# Patient Record
Sex: MALE | Race: WHITE | NOT HISPANIC OR LATINO | Employment: UNEMPLOYED | URBAN - METROPOLITAN AREA
[De-identification: names, ages, dates, MRNs, and addresses within clinical notes are randomized per-mention and may not be internally consistent; named-entity substitution may affect disease eponyms.]

---

## 2018-12-20 ENCOUNTER — APPOINTMENT (OUTPATIENT)
Dept: RADIOLOGY | Facility: CLINIC | Age: 23
End: 2018-12-20
Payer: COMMERCIAL

## 2018-12-20 DIAGNOSIS — M79.674 PAIN IN TOE OF RIGHT FOOT: ICD-10-CM

## 2018-12-20 PROCEDURE — 73660 X-RAY EXAM OF TOE(S): CPT

## 2019-01-24 ENCOUNTER — ANESTHESIA EVENT (OUTPATIENT)
Dept: PERIOP | Facility: HOSPITAL | Age: 24
End: 2019-01-24
Payer: COMMERCIAL

## 2019-01-24 NOTE — PRE-PROCEDURE INSTRUCTIONS
Pre-op Showering Instructions for Surgery Patients    Before your operation, you play an important role in decreasing your risk for infection by washing with special antiseptic soap  This is an effective way to reduce bacteria on the skin which may help to prevent infections at the surgical site  Please read the following directions in advance  1  In the week before your operation, purchase a 4 ounce bottle of antiseptic soap containing chlorhexidine gluconate (CHG)  4%  Some brand names include: Aplicare®, Endure, and Hibiclens®  The cost is usually less than $5 00   For your convenience, the [employer] carries the soap   It may also be available at your doctors office or pre-admission testing center, and at most retail pharmacies   If you are allergic or sensitive to soaps containing CHG, please let your doctor know so another antiseptic can be suggested   CHG antiseptic soap is for external use only  2  The day before your operation, follow these instructions carefully to get ready   Please clean linens (sheets) on your bed; you should sleep on clean sheets after your evening shower   Get clean towels and washcloth ready - you need enough for 2 showers   Set aside clean underwear, pajamas, and clothing to wear after the showers     Reminders:   DO NOT use any other soap or body rinse on your skin during or after the antiseptic showers   DO NOT use lotion, powder, deodorant, or perfume/aftershave of any kind on your skin after your antiseptic shower   DO NOT shave any body parts in the 24 hours/day before your operation   DO NOT get the antiseptic soap in your eyes, ears, nose, mouth, or vaginal area    3  You will need to shower the night before AND the morning of your surgery  Shower 1:   The first evening before the operation, take the first shower   First, shampoo your hair with regular shampoo and rinse it completely before you use the antiseptic soap   After washing and rinsing your hair, rinse your body   Next, use a clean washcloth to apply the antiseptic soap and wash your body from the neck down to your toes using ½ bottle of the antiseptic soap  You will use the other ½ bottle for the second shower   Clean the area where your incision will be; lather this area well for about 2 minutes   If you are having head or neck surgery, wash areas with the antiseptic soap   Rinse yourself completely with running water   Use a clean towel to dry off   Wear clean underwear and clothing/pajamas  Shower 2   The morning of your operation, take the second shower following the same steps as Shower 1 using the second ½ of the bottle of antiseptic soap   Use clean cloths and towels to wash and dry yourself   Wear clean underwear and clothing    No outpatient prescriptions have been marked as taking for the 1/25/19 encounter [facility] Encounter)

## 2019-01-25 ENCOUNTER — HOSPITAL ENCOUNTER (OUTPATIENT)
Facility: HOSPITAL | Age: 24
Setting detail: OUTPATIENT SURGERY
Discharge: HOME | End: 2019-01-25
Attending: PODIATRIST | Admitting: PODIATRIST
Payer: COMMERCIAL

## 2019-01-25 ENCOUNTER — ANESTHESIA (OUTPATIENT)
Dept: PERIOP | Facility: HOSPITAL | Age: 24
End: 2019-01-25
Payer: COMMERCIAL

## 2019-01-25 ENCOUNTER — APPOINTMENT (OUTPATIENT)
Dept: RADIOLOGY | Facility: HOSPITAL | Age: 24
End: 2019-01-25
Payer: COMMERCIAL

## 2019-01-25 VITALS
BODY MASS INDEX: 22.73 KG/M2 | HEART RATE: 64 BPM | SYSTOLIC BLOOD PRESSURE: 103 MMHG | HEIGHT: 68 IN | WEIGHT: 150 LBS | DIASTOLIC BLOOD PRESSURE: 46 MMHG | OXYGEN SATURATION: 99 % | RESPIRATION RATE: 14 BRPM | TEMPERATURE: 97.6 F

## 2019-01-25 DIAGNOSIS — M79.5 FOREIGN BODY (FB) IN SOFT TISSUE: Primary | ICD-10-CM

## 2019-01-25 PROCEDURE — 73620 X-RAY EXAM OF FOOT: CPT

## 2019-01-25 RX ORDER — CEFAZOLIN SODIUM 1 G/50ML
1000 SOLUTION INTRAVENOUS ONCE
Status: COMPLETED | OUTPATIENT
Start: 2019-01-25 | End: 2019-01-25

## 2019-01-25 RX ORDER — OXYCODONE HYDROCHLORIDE AND ACETAMINOPHEN 5; 325 MG/1; MG/1
1 TABLET ORAL EVERY 4 HOURS PRN
Qty: 10 TABLET | Refills: 0 | Status: SHIPPED | OUTPATIENT
Start: 2019-01-25 | End: 2019-02-04

## 2019-01-25 RX ORDER — MAGNESIUM HYDROXIDE 1200 MG/15ML
LIQUID ORAL AS NEEDED
Status: DISCONTINUED | OUTPATIENT
Start: 2019-01-25 | End: 2019-01-25 | Stop reason: HOSPADM

## 2019-01-25 RX ORDER — LIDOCAINE HYDROCHLORIDE 10 MG/ML
INJECTION, SOLUTION INFILTRATION; PERINEURAL AS NEEDED
Status: DISCONTINUED | OUTPATIENT
Start: 2019-01-25 | End: 2019-01-25 | Stop reason: SURG

## 2019-01-25 RX ORDER — PROPOFOL 10 MG/ML
INJECTION, EMULSION INTRAVENOUS CONTINUOUS PRN
Status: DISCONTINUED | OUTPATIENT
Start: 2019-01-25 | End: 2019-01-25 | Stop reason: SURG

## 2019-01-25 RX ORDER — GLYCOPYRROLATE 0.2 MG/ML
INJECTION INTRAMUSCULAR; INTRAVENOUS AS NEEDED
Status: DISCONTINUED | OUTPATIENT
Start: 2019-01-25 | End: 2019-01-25 | Stop reason: SURG

## 2019-01-25 RX ORDER — ONDANSETRON 2 MG/ML
INJECTION INTRAMUSCULAR; INTRAVENOUS AS NEEDED
Status: DISCONTINUED | OUTPATIENT
Start: 2019-01-25 | End: 2019-01-25 | Stop reason: SURG

## 2019-01-25 RX ORDER — MIDAZOLAM HYDROCHLORIDE 1 MG/ML
INJECTION INTRAMUSCULAR; INTRAVENOUS AS NEEDED
Status: DISCONTINUED | OUTPATIENT
Start: 2019-01-25 | End: 2019-01-25 | Stop reason: SURG

## 2019-01-25 RX ORDER — PROPOFOL 10 MG/ML
INJECTION, EMULSION INTRAVENOUS AS NEEDED
Status: DISCONTINUED | OUTPATIENT
Start: 2019-01-25 | End: 2019-01-25 | Stop reason: SURG

## 2019-01-25 RX ORDER — SODIUM CHLORIDE 9 MG/ML
125 INJECTION, SOLUTION INTRAVENOUS CONTINUOUS
Status: DISCONTINUED | OUTPATIENT
Start: 2019-01-25 | End: 2019-01-25 | Stop reason: HOSPADM

## 2019-01-25 RX ORDER — FENTANYL CITRATE 50 UG/ML
INJECTION, SOLUTION INTRAMUSCULAR; INTRAVENOUS AS NEEDED
Status: DISCONTINUED | OUTPATIENT
Start: 2019-01-25 | End: 2019-01-25 | Stop reason: SURG

## 2019-01-25 RX ORDER — SODIUM CHLORIDE, SODIUM LACTATE, POTASSIUM CHLORIDE, CALCIUM CHLORIDE 600; 310; 30; 20 MG/100ML; MG/100ML; MG/100ML; MG/100ML
50 INJECTION, SOLUTION INTRAVENOUS CONTINUOUS
Status: DISCONTINUED | OUTPATIENT
Start: 2019-01-26 | End: 2019-01-25 | Stop reason: HOSPADM

## 2019-01-25 RX ORDER — OXYCODONE HYDROCHLORIDE AND ACETAMINOPHEN 5; 325 MG/1; MG/1
1 TABLET ORAL EVERY 4 HOURS PRN
Status: DISCONTINUED | OUTPATIENT
Start: 2019-01-25 | End: 2019-01-25 | Stop reason: HOSPADM

## 2019-01-25 RX ADMIN — ONDANSETRON HYDROCHLORIDE 4 MG: 2 INJECTION, SOLUTION INTRAMUSCULAR; INTRAVENOUS at 10:24

## 2019-01-25 RX ADMIN — FENTANYL CITRATE 100 MCG: 50 INJECTION INTRAMUSCULAR; INTRAVENOUS at 09:53

## 2019-01-25 RX ADMIN — PROPOFOL 50 MG: 10 INJECTION, EMULSION INTRAVENOUS at 09:57

## 2019-01-25 RX ADMIN — PROPOFOL 50 MG: 10 INJECTION, EMULSION INTRAVENOUS at 10:00

## 2019-01-25 RX ADMIN — CEFAZOLIN SODIUM 1000 MG: 1 SOLUTION INTRAVENOUS at 10:05

## 2019-01-25 RX ADMIN — LIDOCAINE HYDROCHLORIDE 50 MG: 10 INJECTION, SOLUTION INFILTRATION; PERINEURAL at 09:57

## 2019-01-25 RX ADMIN — GLYCOPYRROLATE 0.3 MG: 0.2 INJECTION, SOLUTION INTRAMUSCULAR; INTRAVENOUS at 09:58

## 2019-01-25 RX ADMIN — SODIUM CHLORIDE, POTASSIUM CHLORIDE, SODIUM LACTATE AND CALCIUM CHLORIDE 50 ML/HR: 600; 310; 30; 20 INJECTION, SOLUTION INTRAVENOUS at 09:31

## 2019-01-25 RX ADMIN — PROPOFOL 100 MCG/KG/MIN: 10 INJECTION, EMULSION INTRAVENOUS at 09:57

## 2019-01-25 RX ADMIN — MIDAZOLAM HYDROCHLORIDE 2 MG: 1 INJECTION, SOLUTION INTRAMUSCULAR; INTRAVENOUS at 09:53

## 2019-01-25 NOTE — DISCHARGE SUMMARY
Discharge Summary Outpatient Procedure Podiatry -   Tico Kraft 21 y o  male MRN: [id number]  Unit/Bed#: [facility] Encounter: [id number]    Admission Date: 1/25/2019     Admitting Diagnosis: Superficial foreign body of right great toe [S90 451A]    Discharge Diagnosis: same    Procedures Performed: REMOVAL RETAINED FOREIGN BODY RIGHT GREAT TOE: 61307 (CPT®)    Complications: none    Condition at Discharge: stable    Discharge instructions/Information to patient and family:   See after visit summary for information provided to patient and family  Provisions for Follow-Up Care/Important appointments:  See after visit summary for information related to follow-up care and any pertinent home health orders  Discharge Medications:  See after visit summary for reconciled discharge medications provided to patient and family

## 2019-01-25 NOTE — DISCHARGE INSTRUCTIONS
[facility]  Dr Courtney Irvin  Post-Operative Instructions    1  Take your prescribed medication as directed  2  Upon arrival at home, lie down and elevate your surgical foot on 2 pillows  3  Remain quiet, off your feet as much as possible, for the first 24-48 hours  This is when your feet first swell and may become painful  After 48 hours you may begin limited walking following these restrictions:   Weightbear as tolerated to surgical foot  4  Drink large quantities of water and citrus fruit juice  Consume no alcohol  Continue a well-balanced diet  5  Report any unusual discomfort or fever to this office  6  A limited amount of discomfort and swelling is to be expected  In some cases the skin may take on a bruised appearance  The surgical solution that was applied to your foot prior to the operation is dark in color and the operation site may appear to be oozing when it actually is not  7  A slight amount of blood is to be expected, and is no cause for alarm  Do not remove the dressings  If there is active bleeding and if the bleeding persists, add additional gauze to the bandage, apply direct pressure, elevate your feet and call this office  8  Do not get the dressings wet  As regular bathing may be inconvenient, sponge baths are recommended  9  When anesthesia wears off and if any discomfort should be present, apply an ice pack directly over the operated area for 15 minute intervals for several hours or until the pain leaves  (USE IN EXCESS OF 15 MINUTES COULD CAUSE FROSTBITE)  Do not use hot water bags or electric pads  A convenient icepack can be made by placing ice cubes in a plastic bag and covering this with a towel  10  If necessary, take a mild laxative before retiring  11  Wear your special open shoes anytime you put weight on your foot, even if it is just to walk to the bathroom and back   It will probably be 2 or 3 weeks before you will be permitted to try regular dimitri   12  Having performed the operation, we are interested in a prompt recovery  Please cooperate by following the above instructions  13  Please call to confirm your post-op appointment or call with any other questions

## 2019-01-25 NOTE — ANESTHESIA POSTPROCEDURE EVALUATION
Post-Op Assessment Note      CV Status:  Stable    Mental Status:  Alert    Hydration Status:  Stable    PONV Controlled:  Controlled    Airway Patency:  Patent    Post Op Vitals Reviewed: Yes          Staff: CRNA           BP   113/55   Temp 97 7 °F (36 5 °C) (01/25/19 1042)    Pulse  74   Resp 12 (01/25/19 1042)    SpO2   100

## 2019-01-25 NOTE — ANESTHESIA PREPROCEDURE EVALUATION
Review of Systems/Medical History          Cardiovascular  Exercise tolerance (METS): >4,     Pulmonary  Negative pulmonary ROS        GI/Hepatic  Negative GI/hepatic ROS          Negative  ROS        Endo/Other  Negative endo/other ROS      GYN       Hematology  Negative hematology ROS      Musculoskeletal  Negative musculoskeletal ROS        Neurology  Negative neurology ROS      Psychology   Negative psychology ROS              Physical Exam    Airway    Mallampati score: II  TM Distance: >3 FB  Neck ROM: full     Dental       Cardiovascular  Cardiovascular exam normal    Pulmonary  Pulmonary exam normal     Other Findings        Anesthesia Plan  ASA Score- 1     Anesthesia Type-     Plan Factors-Patient not instructed to abstain from smoking on day of procedure  Patient did not smoke on day of surgery  Induction- intravenous  Postoperative Plan-     Informed Consent- Anesthetic plan and risks discussed with patient  I personally reviewed this patient with the CRNA  Discussed and agreed on the Anesthesia Plan with the CRNA  Jameel Cisneros           No results found for: HGBA1C    No results found for: NA, K, CL, CO2, ANIONGAP, BUN, CREATININE, GLUCOSE, GLUF, CALCIUM, CORRECTEDCA, AST, ALT, ALKPHOS, PROT, BILITOT, EGFR    No results found for: WBC, HGB, HCT, MCV, PLT

## 2019-01-25 NOTE — NURSING NOTE
Pt is awake, alert, tolerated diet  OOB to Br, surgical shoe on, operative dressing remains clean dry and intact  Toes to operative foot are pink and warm  Written and verbal instructions given  Pt and mother verbalize an understanding  Rx given with instructions on use

## 2019-01-25 NOTE — OP NOTE
OPERATIVE REPORT  PATIENT NAME: John Rice    :  1995  MRN: [id number]  Pt Location:  OR ROOM 11    SURGERY DATE: 2019    Surgeon(s) and Role:     * Maryse Du DPM - Primary     * Anselmo Chambersr, BRITTANY - Assisting    Preop Diagnosis:  Superficial foreign body of right great toe [S90 451A]    Post-Op Diagnosis Codes:     * Superficial foreign body of right great toe [S90 451A]    Procedure(s) (LRB):  REMOVAL RETAINED FOREIGN BODY RIGHT GREAT TOE (Right)    Specimen(s):  * No specimens in log *    Estimated Blood Loss:   Minimal    Drains:       Anesthesia Type:   IV Sedation with Anesthesia    Operative Indications:  Superficial foreign body of right great toe [S90 451A]      Operative Findings:  Consistent with diagnosis; metallic foreign body removed     Complications:   None    Procedure and Technique:  Under mild sedation the patient was brought into the operating room and transferred to operating table in a supine position  IV sedation was achieved by anesthesia team and a universal time-out was performed where our parties in agreement correct patient, correct procedure and correct site  6 cc of 1:1 mixture 1% lidocaine plain and 0 5% Marcaine plain was then injected into the right foot in a hallux block fashion  A pneumatic ankle tourniquet was then placed over the right ankle with ample Webril padding  The right foot was then prepped and draped in usual aseptic manner  Attention was directed to the right great toe and a linear incision was placed over the plantar lateral aspect using 15 blade  Using a hemostat and under fluoroscopy guidance the foreign body was identified and removed  Fluoroscopy was used to ensure that the foreign body has been removed prior to closing  The wound was then irrigated with copious amounts of normal saline  The skin and subcutaneous tissue was then reapproximated using 4-0 nylon    The incision was then dressed with Adaptic and dry sterile dressing  The pneumatic ankle tourniquet was then deflated and proper hyperemic response was noted  The patient tolerated procedure and anesthesia without immediate complication and transferred to PACU with vital signs stable      Dr Thanh Hammer was present for the entire procedure    Patient Disposition:  PACU  and hemodynamically stable    SIGNATURE: Colen Gaucher, DPM  DATE: January 25, 2019  TIME: 10:46 AM

## 2019-01-25 NOTE — PERIOPERATIVE NURSING NOTE
Returned from pacu sleepy but arousable  Denies pain  Dressing dry and intact  Positive pulseseating and drinking

## (undated) DEVICE — STRETCH BANDAGE: Brand: CURITY

## (undated) DEVICE — SYRINGE 10ML LL CONTROL TOP

## (undated) DEVICE — GAUZE SPONGES,16 PLY: Brand: CURITY

## (undated) DEVICE — POV-IOD SOLUTION 4OZ BT

## (undated) DEVICE — STERILE POLYISOPRENE POWDER-FREE SURGICAL GLOVES: Brand: PROTEXIS

## (undated) DEVICE — DRAPE C-ARM X-RAY

## (undated) DEVICE — STERILE POLYISOPRENE POWDER-FREE SURGICAL GLOVES WITH EMOLLIENT COATING: Brand: PROTEXIS

## (undated) DEVICE — SYRINGE 10ML LL

## (undated) DEVICE — CUFF TOURNIQUET DISP SZ18

## (undated) DEVICE — STOCKINETTE 2P PREROLLD 6X60

## (undated) DEVICE — COBAN 4 IN STERILE

## (undated) DEVICE — CHLORAPREP HI-LITE 26ML ORANGE

## (undated) DEVICE — INTENDED FOR TISSUE SEPARATION, AND OTHER PROCEDURES THAT REQUIRE A SHARP SURGICAL BLADE TO PUNCTURE OR CUT.: Brand: BARD-PARKER SAFETY BLADES SIZE 15, STERILE

## (undated) DEVICE — CURITY NON-ADHERENT STRIPS: Brand: CURITY

## (undated) DEVICE — BETHLEHEM UNIVERSAL  MIONR EXT: Brand: CARDINAL HEALTH

## (undated) DEVICE — NEEDLE 25G X 1 1/2

## (undated) DEVICE — NEEDLE BLUNT 18 G X 1 1/2IN

## (undated) DEVICE — CURITY STRETCH BANDAGE: Brand: CURITY